# Patient Record
Sex: MALE | Race: WHITE | Employment: FULL TIME | URBAN - METROPOLITAN AREA
[De-identification: names, ages, dates, MRNs, and addresses within clinical notes are randomized per-mention and may not be internally consistent; named-entity substitution may affect disease eponyms.]

---

## 2017-05-23 ENCOUNTER — OFFICE VISIT (OUTPATIENT)
Dept: FAMILY MEDICINE CLINIC | Age: 47
End: 2017-05-23

## 2017-05-23 VITALS
WEIGHT: 315 LBS | HEIGHT: 72 IN | SYSTOLIC BLOOD PRESSURE: 134 MMHG | OXYGEN SATURATION: 97 % | BODY MASS INDEX: 42.66 KG/M2 | HEART RATE: 68 BPM | DIASTOLIC BLOOD PRESSURE: 88 MMHG

## 2017-05-23 DIAGNOSIS — J30.1 SEASONAL ALLERGIC RHINITIS DUE TO POLLEN: ICD-10-CM

## 2017-05-23 DIAGNOSIS — E66.01 OBESITY, CLASS III, BMI 40-49.9 (MORBID OBESITY) (HCC): Primary | ICD-10-CM

## 2017-05-23 DIAGNOSIS — I10 ESSENTIAL HYPERTENSION: ICD-10-CM

## 2017-05-23 DIAGNOSIS — K58.2 IRRITABLE BOWEL SYNDROME WITH BOTH CONSTIPATION AND DIARRHEA: ICD-10-CM

## 2017-05-23 PROCEDURE — 99214 OFFICE O/P EST MOD 30 MIN: CPT | Performed by: FAMILY MEDICINE

## 2017-05-23 RX ORDER — CETIRIZINE HYDROCHLORIDE 10 MG/1
10 TABLET ORAL DAILY
Qty: 30 TABLET | Refills: 5 | Status: SHIPPED | OUTPATIENT
Start: 2017-05-23 | End: 2021-09-07

## 2017-05-23 RX ORDER — LISINOPRIL 20 MG/1
20 TABLET ORAL DAILY
Qty: 30 TABLET | Refills: 5 | Status: SHIPPED | OUTPATIENT
Start: 2017-05-23 | End: 2021-09-07

## 2017-05-23 RX ORDER — PANTOPRAZOLE SODIUM 40 MG/1
40 TABLET, DELAYED RELEASE ORAL DAILY
Qty: 30 TABLET | Refills: 5 | Status: SHIPPED | OUTPATIENT
Start: 2017-05-23 | End: 2021-09-07

## 2017-05-23 RX ORDER — CHLORDIAZEPOXIDE HYDROCHLORIDE AND CLIDINIUM BROMIDE 5; 2.5 MG/1; MG/1
1 CAPSULE ORAL
Qty: 90 CAPSULE | Refills: 5 | Status: SHIPPED | OUTPATIENT
Start: 2017-05-23 | End: 2021-09-07

## 2017-05-23 ASSESSMENT — ENCOUNTER SYMPTOMS
SINUS PRESSURE: 1
SHORTNESS OF BREATH: 0
DIARRHEA: 1
ABDOMINAL PAIN: 0
CHOKING: 0
CHEST TIGHTNESS: 0
COUGH: 0
CONSTIPATION: 1
BACK PAIN: 0
RHINORRHEA: 1

## 2021-09-07 ENCOUNTER — APPOINTMENT (OUTPATIENT)
Dept: CT IMAGING | Age: 51
DRG: 177 | End: 2021-09-07
Payer: COMMERCIAL

## 2021-09-07 ENCOUNTER — APPOINTMENT (OUTPATIENT)
Dept: GENERAL RADIOLOGY | Age: 51
DRG: 177 | End: 2021-09-07
Payer: COMMERCIAL

## 2021-09-07 ENCOUNTER — HOSPITAL ENCOUNTER (INPATIENT)
Age: 51
LOS: 2 days | Discharge: HOME | DRG: 177 | End: 2021-09-09
Attending: STUDENT IN AN ORGANIZED HEALTH CARE EDUCATION/TRAINING PROGRAM | Admitting: STUDENT IN AN ORGANIZED HEALTH CARE EDUCATION/TRAINING PROGRAM
Payer: COMMERCIAL

## 2021-09-07 DIAGNOSIS — J96.01 ACUTE RESPIRATORY FAILURE WITH HYPOXIA (HCC): Primary | ICD-10-CM

## 2021-09-07 DIAGNOSIS — U07.1 COVID-19: ICD-10-CM

## 2021-09-07 PROBLEM — J12.82 PNEUMONIA DUE TO COVID-19 VIRUS: Status: ACTIVE | Noted: 2021-09-07

## 2021-09-07 PROBLEM — I10 HTN (HYPERTENSION): Status: ACTIVE | Noted: 2021-09-07

## 2021-09-07 LAB
ANION GAP SERPL CALCULATED.3IONS-SCNC: 14 MMOL/L (ref 3–16)
BASOPHILS ABSOLUTE: 0.1 K/UL (ref 0–0.2)
BASOPHILS RELATIVE PERCENT: 1 %
BUN BLDV-MCNC: 6 MG/DL (ref 7–20)
CALCIUM SERPL-MCNC: 8.9 MG/DL (ref 8.3–10.6)
CHLORIDE BLD-SCNC: 97 MMOL/L (ref 99–110)
CO2: 24 MMOL/L (ref 21–32)
CREAT SERPL-MCNC: 0.8 MG/DL (ref 0.9–1.3)
EKG ATRIAL RATE: 74 BPM
EKG DIAGNOSIS: NORMAL
EKG P AXIS: 15 DEGREES
EKG P-R INTERVAL: 162 MS
EKG Q-T INTERVAL: 420 MS
EKG QRS DURATION: 96 MS
EKG QTC CALCULATION (BAZETT): 466 MS
EKG R AXIS: 71 DEGREES
EKG T AXIS: -3 DEGREES
EKG VENTRICULAR RATE: 74 BPM
EOSINOPHILS ABSOLUTE: 0.3 K/UL (ref 0–0.6)
EOSINOPHILS RELATIVE PERCENT: 4 %
GFR AFRICAN AMERICAN: >60
GFR NON-AFRICAN AMERICAN: >60
GLUCOSE BLD-MCNC: 89 MG/DL (ref 70–99)
HCT VFR BLD CALC: 42.9 % (ref 40.5–52.5)
HEMOGLOBIN: 14.7 G/DL (ref 13.5–17.5)
LYMPHOCYTES ABSOLUTE: 2.2 K/UL (ref 1–5.1)
LYMPHOCYTES RELATIVE PERCENT: 28 %
MCH RBC QN AUTO: 31 PG (ref 26–34)
MCHC RBC AUTO-ENTMCNC: 34.2 G/DL (ref 31–36)
MCV RBC AUTO: 90.7 FL (ref 80–100)
MONOCYTES ABSOLUTE: 0.8 K/UL (ref 0–1.3)
MONOCYTES RELATIVE PERCENT: 10 %
NEUTROPHILS ABSOLUTE: 4.4 K/UL (ref 1.7–7.7)
NEUTROPHILS RELATIVE PERCENT: 57 %
PDW BLD-RTO: 12.6 % (ref 12.4–15.4)
PLATELET # BLD: 252 K/UL (ref 135–450)
PLATELET SLIDE REVIEW: ADEQUATE
PMV BLD AUTO: 8.3 FL (ref 5–10.5)
POTASSIUM REFLEX MAGNESIUM: 3.6 MMOL/L (ref 3.5–5.1)
PRO-BNP: 67 PG/ML (ref 0–124)
RBC # BLD: 4.73 M/UL (ref 4.2–5.9)
RBC # BLD: NORMAL 10*6/UL
SLIDE REVIEW: NORMAL
SODIUM BLD-SCNC: 135 MMOL/L (ref 136–145)
TROPONIN: <0.01 NG/ML
WBC # BLD: 7.7 K/UL (ref 4–11)

## 2021-09-07 PROCEDURE — 6360000002 HC RX W HCPCS: Performed by: STUDENT IN AN ORGANIZED HEALTH CARE EDUCATION/TRAINING PROGRAM

## 2021-09-07 PROCEDURE — U0003 INFECTIOUS AGENT DETECTION BY NUCLEIC ACID (DNA OR RNA); SEVERE ACUTE RESPIRATORY SYNDROME CORONAVIRUS 2 (SARS-COV-2) (CORONAVIRUS DISEASE [COVID-19]), AMPLIFIED PROBE TECHNIQUE, MAKING USE OF HIGH THROUGHPUT TECHNOLOGIES AS DESCRIBED BY CMS-2020-01-R: HCPCS

## 2021-09-07 PROCEDURE — U0005 INFEC AGEN DETEC AMPLI PROBE: HCPCS

## 2021-09-07 PROCEDURE — 2580000003 HC RX 258: Performed by: STUDENT IN AN ORGANIZED HEALTH CARE EDUCATION/TRAINING PROGRAM

## 2021-09-07 PROCEDURE — 1200000000 HC SEMI PRIVATE

## 2021-09-07 PROCEDURE — 85025 COMPLETE CBC W/AUTO DIFF WBC: CPT

## 2021-09-07 PROCEDURE — 99285 EMERGENCY DEPT VISIT HI MDM: CPT

## 2021-09-07 PROCEDURE — 84484 ASSAY OF TROPONIN QUANT: CPT

## 2021-09-07 PROCEDURE — 6370000000 HC RX 637 (ALT 250 FOR IP): Performed by: PHYSICIAN ASSISTANT

## 2021-09-07 PROCEDURE — 93010 ELECTROCARDIOGRAM REPORT: CPT | Performed by: INTERNAL MEDICINE

## 2021-09-07 PROCEDURE — 83880 ASSAY OF NATRIURETIC PEPTIDE: CPT

## 2021-09-07 PROCEDURE — 71260 CT THORAX DX C+: CPT

## 2021-09-07 PROCEDURE — 71046 X-RAY EXAM CHEST 2 VIEWS: CPT

## 2021-09-07 PROCEDURE — 80048 BASIC METABOLIC PNL TOTAL CA: CPT

## 2021-09-07 PROCEDURE — 6360000004 HC RX CONTRAST MEDICATION: Performed by: PHYSICIAN ASSISTANT

## 2021-09-07 PROCEDURE — 36415 COLL VENOUS BLD VENIPUNCTURE: CPT

## 2021-09-07 PROCEDURE — 93005 ELECTROCARDIOGRAM TRACING: CPT | Performed by: STUDENT IN AN ORGANIZED HEALTH CARE EDUCATION/TRAINING PROGRAM

## 2021-09-07 RX ORDER — BENZONATATE 100 MG/1
100 CAPSULE ORAL 3 TIMES DAILY PRN
Status: DISCONTINUED | OUTPATIENT
Start: 2021-09-07 | End: 2021-09-09 | Stop reason: HOSPADM

## 2021-09-07 RX ORDER — FUROSEMIDE 40 MG/1
40 TABLET ORAL DAILY PRN
COMMUNITY

## 2021-09-07 RX ORDER — ONDANSETRON 2 MG/ML
4 INJECTION INTRAMUSCULAR; INTRAVENOUS EVERY 6 HOURS PRN
Status: DISCONTINUED | OUTPATIENT
Start: 2021-09-07 | End: 2021-09-09 | Stop reason: HOSPADM

## 2021-09-07 RX ORDER — SODIUM CHLORIDE 0.9 % (FLUSH) 0.9 %
5-40 SYRINGE (ML) INJECTION PRN
Status: DISCONTINUED | OUTPATIENT
Start: 2021-09-07 | End: 2021-09-09 | Stop reason: HOSPADM

## 2021-09-07 RX ORDER — PREDNISONE 20 MG/1
60 TABLET ORAL ONCE
Status: COMPLETED | OUTPATIENT
Start: 2021-09-07 | End: 2021-09-07

## 2021-09-07 RX ORDER — SODIUM CHLORIDE 0.9 % (FLUSH) 0.9 %
5-40 SYRINGE (ML) INJECTION EVERY 12 HOURS SCHEDULED
Status: DISCONTINUED | OUTPATIENT
Start: 2021-09-07 | End: 2021-09-09 | Stop reason: HOSPADM

## 2021-09-07 RX ORDER — ACETAMINOPHEN 325 MG/1
650 TABLET ORAL EVERY 6 HOURS PRN
Status: DISCONTINUED | OUTPATIENT
Start: 2021-09-07 | End: 2021-09-09 | Stop reason: HOSPADM

## 2021-09-07 RX ORDER — DEXAMETHASONE 6 MG/1
6 TABLET ORAL DAILY
Status: DISCONTINUED | OUTPATIENT
Start: 2021-09-08 | End: 2021-09-09 | Stop reason: HOSPADM

## 2021-09-07 RX ORDER — PANTOPRAZOLE SODIUM 40 MG/1
40 TABLET, DELAYED RELEASE ORAL DAILY
COMMUNITY

## 2021-09-07 RX ORDER — SODIUM CHLORIDE 9 MG/ML
25 INJECTION, SOLUTION INTRAVENOUS PRN
Status: DISCONTINUED | OUTPATIENT
Start: 2021-09-07 | End: 2021-09-09 | Stop reason: HOSPADM

## 2021-09-07 RX ORDER — METOPROLOL SUCCINATE 50 MG/1
50 TABLET, EXTENDED RELEASE ORAL DAILY
COMMUNITY

## 2021-09-07 RX ORDER — BENZONATATE 100 MG/1
200 CAPSULE ORAL ONCE
Status: COMPLETED | OUTPATIENT
Start: 2021-09-07 | End: 2021-09-07

## 2021-09-07 RX ORDER — LORAZEPAM 1 MG/1
1 TABLET ORAL DAILY PRN
COMMUNITY

## 2021-09-07 RX ORDER — GUAIFENESIN/DEXTROMETHORPHAN 100-10MG/5
5 SYRUP ORAL EVERY 4 HOURS PRN
Status: DISCONTINUED | OUTPATIENT
Start: 2021-09-07 | End: 2021-09-09 | Stop reason: HOSPADM

## 2021-09-07 RX ORDER — ACETAMINOPHEN 650 MG/1
650 SUPPOSITORY RECTAL EVERY 6 HOURS PRN
Status: DISCONTINUED | OUTPATIENT
Start: 2021-09-07 | End: 2021-09-09 | Stop reason: HOSPADM

## 2021-09-07 RX ADMIN — BENZONATATE 200 MG: 100 CAPSULE ORAL at 15:56

## 2021-09-07 RX ADMIN — ENOXAPARIN SODIUM 30 MG: 30 INJECTION SUBCUTANEOUS at 23:45

## 2021-09-07 RX ADMIN — PREDNISONE 60 MG: 20 TABLET ORAL at 15:56

## 2021-09-07 RX ADMIN — IOPAMIDOL 75 ML: 755 INJECTION, SOLUTION INTRAVENOUS at 16:12

## 2021-09-07 RX ADMIN — Medication 10 ML: at 23:46

## 2021-09-07 ASSESSMENT — ENCOUNTER SYMPTOMS
COUGH: 1
DIARRHEA: 1
ABDOMINAL PAIN: 0
SHORTNESS OF BREATH: 1
COLOR CHANGE: 0

## 2021-09-07 ASSESSMENT — PAIN SCALES - GENERAL: PAINLEVEL_OUTOF10: 0

## 2021-09-07 NOTE — ED TRIAGE NOTES
Pt here after testing positive for covid 15 days ago. Pt was feeling better until 2 days ago then started with nasal congestion and coughing more. pts son was just admitted to hospital today.

## 2021-09-07 NOTE — PROGRESS NOTES
Medication Reconciliation    List of medications patient is currently taking is complete. Source of information: 1. Conversation with patient                                       2. EPIC records     Patient did not take any of his home medications yet today.      Ty Waite [facility], PharmD, BCPS  9/7/2021 5:20 PM

## 2021-09-07 NOTE — ED PROVIDER NOTES
[address] [facility]      Pt Name: Lyn Blount  MRN: [id number]  [place] 1970  Date of evaluation: 9/7/2021  Provider: CAREN Garcia Mc    This patient was not seen and evaluated by the attending physician No att. providers found. CHIEF COMPLAINT       Chief Complaint   Patient presents with    Shortness of Breath     + covid 3 weeks ago. increased sob 3 days ago. cough, denies fever. brown productive cough. CRITICAL CARE TIME   I performed a total Critical Care time of 31 minutes, excluding separately reportable procedures. There was a high probability of clinically significant/life threatening deterioration in the patient's condition which required my urgent intervention. Not limited to multiple reexaminations, discussions with attending physician and consultants. HISTORY OF PRESENT ILLNESS  (Location/Symptom, Timing/Onset, Context/Setting, Quality, Duration, Modifying Factors, Severity.)   Lyn Blount is a 53 y.o. male who presents to the emergency department with complaint of continued shortness of breath cough fatigue. He states that on 23 August he developed symptoms on 27 August he tested positive. His son has also been sick and had Covid. He has tried over-the-counter medications including Coricidin, Mucinex, Tylenol. He has past medical history of hypertension but states he is been off of his metoprolol for about 6 months otherwise he denies known chronic medical problems. No leg swelling. No abdominal pain. He has had diarrhea. Nursing Notes were reviewed and I agree. REVIEW OF SYSTEMS    (2-9 systems for level 4, 10 or more for level 5)     Review of Systems   Constitutional: Positive for fatigue and fever. Respiratory: Positive for cough and shortness of breath. Cardiovascular: Negative for chest pain. Gastrointestinal: Positive for diarrhea. Negative for abdominal pain. Musculoskeletal: Positive for myalgias. Negative for neck pain and neck stiffness. Skin: Negative for color change, rash and wound. Neurological: Negative for weakness and numbness. Psychiatric/Behavioral: Negative for agitation, behavioral problems and confusion. Except as noted above the remainder of the review of systems was reviewed and negative. PAST MEDICAL HISTORY         Diagnosis Date    GERD (gastroesophageal reflux disease)        SURGICAL HISTORY           Procedure Laterality Date    KNEE SURGERY Right 2001    KNEE SURGERY  2001       CURRENT MEDICATIONS       Previous Medications    FUROSEMIDE (LASIX) 40 MG TABLET    Take 40 mg by mouth daily as needed (swelling)     LORAZEPAM (ATIVAN) 1 MG TABLET    Take 1 mg by mouth daily as needed for Anxiety. METOPROLOL SUCCINATE (TOPROL XL) 50 MG EXTENDED RELEASE TABLET    Take 50 mg by mouth daily    PANTOPRAZOLE (PROTONIX) 40 MG TABLET    Take 40 mg by mouth daily       ALLERGIES     Hydrochlorothiazide and Nebivolol hcl    FAMILY HISTORY           Problem Relation Age of Onset    Cancer Mother     High Blood Pressure Mother     Heart Disease Mother     Heart Disease Father     Diabetes Father     Heart Disease Maternal Grandfather     Heart Disease Paternal Grandfather      Family Status   Relation Name Status    Mother      Father      MGF  (Not Specified)    PGF  (Not Specified)        SOCIAL HISTORY      reports that he has never smoked. He has never used smokeless tobacco. He reports that he does not drink alcohol and does not use drugs.     PHYSICAL EXAM    (up to 7 for level 4, 8 or more for level 5)     ED Triage Vitals   BP Temp Temp Source Pulse Resp SpO2 Height Weight   21 1255 21 1255 21 1255 21 1255 21 1255 21 1255 21 1245 21 1245   (!) 130/90 98.6 °F (37 °C) Tympanic 87 18 90 % 6' (1.829 m) (!) 333 lb 1.8 oz (151.1 kg)       Physical Exam  Vitals and nursing note reviewed. Constitutional:       Appearance: He is well-developed. HENT:      Head: Normocephalic and atraumatic. Cardiovascular:      Rate and Rhythm: Normal rate. Pulmonary:      Effort: Pulmonary effort is normal.      Breath sounds: Wheezing present. No decreased breath sounds. Musculoskeletal:      Cervical back: Normal range of motion. Right lower leg: No edema. Left lower leg: No edema. Skin:     General: Skin is warm. Neurological:      General: No focal deficit present. Mental Status: He is alert and oriented to person, place, and time. Psychiatric:         Behavior: Behavior normal.         DIAGNOSTIC RESULTS     EKG: All EKG's are interpreted by CAREN Ibrahim in the absence of a cardiologist.    EKG interpreted by myself - please refer to attending physician's note for complete EKG interpretation:    No evidence of acute ischemia or injury. RADIOLOGY:   Non-plain film images such as CT, Ultrasound and MRI are read by the radiologist. Plain radiographic images are visualized and preliminarily interpreted by CAREN Ibrahim with the below findings:    Reviewed radiologist's interpretation. Interpretation per the Radiologist below, if available at the time of this note:    CT CHEST PULMONARY EMBOLISM W CONTRAST   Final Result   1. Patchy nodular bilateral airspace disease due to an   infectious/inflammatory process including atypical viral pneumonia. XR CHEST (2 VW)   Final Result   Bilateral interstitial groundglass opacities are nonspecific and may   represent viral pneumonia, COVID-19 is not excluded.                LABS:  Labs Reviewed   BASIC METABOLIC PANEL W/ REFLEX TO MG FOR LOW K - Abnormal; Notable for the following components:       Result Value    Sodium 135 (*)     Chloride 97 (*)     BUN 6 (*)     CREATININE 0.8 (*)     All other components within normal limits    Narrative:     Performed at:  [facility]  [address] [place], [place] [address]   Phone [phone]   CBC WITH AUTO DIFFERENTIAL    Narrative:     Performed at:  [facility]  [address] [place], [place] [address]   Phone [phone]   BRAIN NATRIURETIC PEPTIDE    Narrative:     Performed at:  [facility]  [address] [place], [place] [address]   Phone [phone]   TROPONIN    Narrative:     Performed at:  [facility]  [address] [place], [place] [address]   Phone [phone]       All other labs were within normal range or not returned as of this dictation. EMERGENCY DEPARTMENT COURSE and DIFFERENTIAL DIAGNOSIS/MDM:   Vitals:    Vitals:    09/07/21 1255 09/07/21 1639 09/07/21 1658 09/07/21 1821   BP: (!) 130/90  117/70 115/77   Pulse: 87 83 74 77   Resp: 18 20 19 19   Temp: 98.6 °F (37 °C)   98.1 °F (36.7 °C)   TempSrc: Tympanic   Oral   SpO2: 90% 94% 93% 94%   Weight:       Height:         I discussed with Yamilex Avila and/or family the exam results, diagnosis, care, prognosis, reasons to return and the importance of follow up. Patient and/or family is in full agreement with plan and all questions have been answered. Specific discharge instructions explained, including reasons to return to the emergency department. Yamilex Avila is well appearing, non-toxic, and afebrile at the time of discharge. Patient is afebrile not tachycardic. He saturating 90% on room air at rest however it with ambulation he dropped to 86%. Positive for Covid several weeks ago but has had increasing and persistent symptoms. In light of hypoxia will consult hospitalist for admission. He stable on 2 L via nasal cannula currently.     I estimate there is LOW risk for PULMONARY EMBOLISM, PULMONARY EDEMA, PNEUMONIA, PNEUMOTHORAX, STATUS ASTHMATICUS, ACUTE RESPIRATORY FAILURE, OR ACUTE CORONARY SYNDROME, thus I consider the discharge disposition reasonable. CONSULTS:  IP CONSULT TO HOSPITALIST    PROCEDURES:  Procedures      FINAL IMPRESSION      1. Acute respiratory failure with hypoxia (HCC)    2. COVID-19          DISPOSITION/PLAN   DISPOSITION Decision To Admit 09/07/2021 07:47:06 PM      PATIENT REFERRED TO:  No follow-up provider specified.     DISCHARGE MEDICATIONS:  New Prescriptions    No medications on file       (Please note that portions of this note were completed with a voice recognition program.  Efforts were made to edit the dictations but occasionally words are mis-transcribed.)    Lena Chaves, [address], [address]  09/07/21 0109

## 2021-09-07 NOTE — ED NOTES
Pts O2 drops to 86% when ambulating. Pt returns to 92% after sitting with rest. Laura FABIAN notified. Pt started on 2L NC.       Andreas Ledesma RN  09/07/21 7560 Meme Voss RN  09/07/21 9730

## 2021-09-07 NOTE — H&P
Hospital Medicine History & Physical      PCP: Antonette Sanders MD    Date of Admission: 9/7/2021    Date of Service: Pt seen/examined on 9/7/2021 and Admitted to Inpatient    Chief Complaint: Worsening shortness of breath, fevers, chills, increasing fatigue, poor appetite      History Of Present Illness: The patient is a 53 y.o. male past medical history of GERD and hypertension who presents to [facility] with initially 3 weeks of persisting symptoms of increasing fatigue and poor appetite as well as some mild initial shortness of breath especially on exertion but has progressively been worsening over the last week or so. Patient apparently tested positive for Covid with initial symptoms starting 3 weeks ago. His son is also diagnosed positive with Covid and potentially his wife is also affected but she currently seems to be more benign at this time. Patient notes he tried multiple over-the-counter medications for his symptoms but he has progressively been declining and has had increasing level of cough and congestion with intermittent fevers and chills at home and increasing fatigue to the point of his inability to function properly at home. He notes that the shortness of breath has been worsening mainly on exertion but does not feel entirely short of breath at rest.  Otherwise denies any other recent symptoms of abdominal pain/nausea/vomiting/diarrhea, dysuria, blood in urine/stool/sputum, leg swelling, chest pain other than what is noted from his coughing.     Past Medical History:        Diagnosis Date    GERD (gastroesophageal reflux disease)        Past Surgical History:        Procedure Laterality Date    KNEE SURGERY Right 2001    KNEE SURGERY  09/02/2001       Medications Prior to Admission:    Prior to Admission medications    Medication Sig Start Date End Date Taking? Authorizing Provider   pantoprazole (PROTONIX) 40 MG tablet Take 40 mg by mouth daily   Yes Historical Provider, MD   metoprolol succinate (TOPROL XL) 50 MG extended release tablet Take 50 mg by mouth daily   Yes Historical Provider, MD   furosemide (LASIX) 40 MG tablet Take 40 mg by mouth daily as needed (swelling)    Yes Historical Provider, MD   LORazepam (ATIVAN) 1 MG tablet Take 1 mg by mouth daily as needed for Anxiety. Yes Historical Provider, MD       Allergies:  Hydrochlorothiazide and Nebivolol hcl    Social History:  The patient currently lives home    TOBACCO:   reports that he has never smoked. He has never used smokeless tobacco.  ETOH:   reports no history of alcohol use. Family History:  Reviewed in detail and negative for DM, Early CAD, Cancer, CVA. Positive as follows:        Problem Relation Age of Onset    Cancer Mother     High Blood Pressure Mother     Heart Disease Mother     Heart Disease Father     Diabetes Father     Heart Disease Maternal Grandfather     Heart Disease Paternal Grandfather        REVIEW OF SYSTEMS:   as noted in the HPI. All other systems reviewed and negative. PHYSICAL EXAM:    /84   Pulse 71   Temp 97.5 °F (36.4 °C) (Oral)   Resp 16   Ht 6' (1.829 m)   Wt (!) 333 lb 8.9 oz (151.3 kg)   SpO2 94%   BMI 45.24 kg/m²     General appearance: Fatigued appearing, on nasal cannula oxygen, currently seems comfortable, alert and oriented x4  HEENT Normal cephalic, atraumatic without obvious deformity. Pupils equal, round, and reactive to light. Extra ocular muscles intact. Conjunctivae/corneas clear.   Neck: Supple, no JVD  Lungs: Diminished breath sounds bilaterally, no wheezing, crackles noted midway up the lungs bilaterally  Heart: Regular rate and rhythm with Normal S1/S2 without murmurs, rubs or gallops, point of maximum impulse non-displaced  Abdomen: Soft, non-tender or non-distended without rigidity or guarding and positive bowel sounds all four quadrants. Extremities: No clubbing, cyanosis, or edema bilaterally. Full range of motion without deformity and normal gait intact. Skin: Skin color, texture, turgor normal.  No rashes or lesions. Neurologic: Alert and oriented X 3, neurovascularly intact with sensory/motor intact upper extremities/lower extremities, bilaterally. Cranial nerves: II-XII intact, grossly non-focal.  Mental status: Alert, oriented, thought content appropriate. Capillary Refill: Acceptable  < 3 seconds  Peripheral Pulses: +3 Easily felt, not easily obliterated with pressure    CT chest pulmonary embolism with IV contrast: Patchy nodular bilateral airspace disease due to an infectious or inflammatory process including atypical viral pneumonia. CBC   Recent Labs     09/07/21  1316 09/08/21  0644   WBC 7.7 6.3   HGB 14.7 15.2   HCT 42.9 43.3    254      RENAL  Recent Labs     09/07/21  1316 09/08/21  0644   * 136   K 3.6 4.1   CL 97* 98*   CO2 24 26   BUN 6* 8   CREATININE 0.8* 0.7*     LFT'S  No results for input(s): AST, ALT, ALB, BILIDIR, BILITOT, ALKPHOS in the last 72 hours. COAG  No results for input(s): INR in the last 72 hours.   CARDIAC ENZYMES  Recent Labs     09/07/21 1316   TROPONINI <0.01       U/A:  No results found for: NITRITE, COLORU, WBCUA, RBCUA, MUCUS, BACTERIA, CLARITYU, SPECGRAV, LEUKOCYTESUR, BLOODU, GLUCOSEU, AMORPHOUS    ABG  No results found for: DYO0PYR, BEART, O8PONCCS, PHART, THGBART, RCP4NWT, PO2ART, ENC8WBM        Active Hospital Problems    Diagnosis Date Noted    Pneumonia due to COVID-19 virus [U07.1, J12.82] 09/07/2021     Priority: High    HTN (hypertension) [I10] 09/07/2021         PHYSICIANS CERTIFICATION:    I certify that Greg De Anda is expected to be hospitalized for greater than 2 midnights based on the following assessment and plan:      ASSESSMENT/PLAN:  · Patient currently maintaining oxygen status on 2 L, starting patient on Decadron therapy daily  · Reevaluation to consider other Covid therapies  · Repeat labs in the morning      DVT Prophylaxis: Lovenox  Diet: ADULT DIET; Regular; Low Sodium (2 gm)  Code Status: Limited  PT/OT Eval Status: Ambulatory    Dispo -pending clinical course       Tom Patton DO    Thank you Boubacar Orta MD for the opportunity to be involved in this patient's care. If you have any questions or concerns please feel free to contact me at [phone].

## 2021-09-07 NOTE — LETTER
NOTIFICATION RETURN TO WORK / SCHOOL    9/9/2021    Mr. Tejal Correa  [address] [address]      To Whom It May Concern:    Tejal Correa was tested for COVID-19 on September 7, 2021 and the result was positive. He may return to work after September 21, 2021. If there are questions or concerns, please have the patient contact our office. Sincerely,      ____________________________________  Jayson Molina.  [facility] TEE, [facility]   [phone]

## 2021-09-08 LAB
ANION GAP SERPL CALCULATED.3IONS-SCNC: 12 MMOL/L (ref 3–16)
BASOPHILS ABSOLUTE: 0.1 K/UL (ref 0–0.2)
BASOPHILS RELATIVE PERCENT: 1.1 %
BUN BLDV-MCNC: 8 MG/DL (ref 7–20)
C-REACTIVE PROTEIN: 10.7 MG/L (ref 0–5.1)
CALCIUM SERPL-MCNC: 9 MG/DL (ref 8.3–10.6)
CHLORIDE BLD-SCNC: 98 MMOL/L (ref 99–110)
CO2: 26 MMOL/L (ref 21–32)
CREAT SERPL-MCNC: 0.7 MG/DL (ref 0.9–1.3)
D DIMER: <200 NG/ML DDU (ref 0–229)
EOSINOPHILS ABSOLUTE: 0 K/UL (ref 0–0.6)
EOSINOPHILS RELATIVE PERCENT: 0.6 %
GFR AFRICAN AMERICAN: >60
GFR NON-AFRICAN AMERICAN: >60
GLUCOSE BLD-MCNC: 113 MG/DL (ref 70–99)
HCT VFR BLD CALC: 43.3 % (ref 40.5–52.5)
HEMOGLOBIN: 15.2 G/DL (ref 13.5–17.5)
LYMPHOCYTES ABSOLUTE: 1.2 K/UL (ref 1–5.1)
LYMPHOCYTES RELATIVE PERCENT: 19 %
MAGNESIUM: 2.2 MG/DL (ref 1.8–2.4)
MCH RBC QN AUTO: 31.9 PG (ref 26–34)
MCHC RBC AUTO-ENTMCNC: 35.2 G/DL (ref 31–36)
MCV RBC AUTO: 90.5 FL (ref 80–100)
MONOCYTES ABSOLUTE: 0.7 K/UL (ref 0–1.3)
MONOCYTES RELATIVE PERCENT: 11.1 %
NEUTROPHILS ABSOLUTE: 4.3 K/UL (ref 1.7–7.7)
NEUTROPHILS RELATIVE PERCENT: 68.2 %
PDW BLD-RTO: 12.4 % (ref 12.4–15.4)
PLATELET # BLD: 254 K/UL (ref 135–450)
PMV BLD AUTO: 8.2 FL (ref 5–10.5)
POTASSIUM SERPL-SCNC: 4.1 MMOL/L (ref 3.5–5.1)
PROCALCITONIN: 0.06 NG/ML (ref 0–0.15)
RBC # BLD: 4.78 M/UL (ref 4.2–5.9)
SARS-COV-2: DETECTED
SODIUM BLD-SCNC: 136 MMOL/L (ref 136–145)
WBC # BLD: 6.3 K/UL (ref 4–11)

## 2021-09-08 PROCEDURE — 83735 ASSAY OF MAGNESIUM: CPT

## 2021-09-08 PROCEDURE — 2700000000 HC OXYGEN THERAPY PER DAY

## 2021-09-08 PROCEDURE — 1200000000 HC SEMI PRIVATE

## 2021-09-08 PROCEDURE — 6370000000 HC RX 637 (ALT 250 FOR IP): Performed by: STUDENT IN AN ORGANIZED HEALTH CARE EDUCATION/TRAINING PROGRAM

## 2021-09-08 PROCEDURE — 84145 PROCALCITONIN (PCT): CPT

## 2021-09-08 PROCEDURE — 2580000003 HC RX 258: Performed by: STUDENT IN AN ORGANIZED HEALTH CARE EDUCATION/TRAINING PROGRAM

## 2021-09-08 PROCEDURE — 36415 COLL VENOUS BLD VENIPUNCTURE: CPT

## 2021-09-08 PROCEDURE — 85379 FIBRIN DEGRADATION QUANT: CPT

## 2021-09-08 PROCEDURE — 80048 BASIC METABOLIC PNL TOTAL CA: CPT

## 2021-09-08 PROCEDURE — 6360000002 HC RX W HCPCS: Performed by: STUDENT IN AN ORGANIZED HEALTH CARE EDUCATION/TRAINING PROGRAM

## 2021-09-08 PROCEDURE — 85025 COMPLETE CBC W/AUTO DIFF WBC: CPT

## 2021-09-08 PROCEDURE — 86140 C-REACTIVE PROTEIN: CPT

## 2021-09-08 PROCEDURE — 94760 N-INVAS EAR/PLS OXIMETRY 1: CPT

## 2021-09-08 RX ORDER — LORAZEPAM 1 MG/1
1 TABLET ORAL DAILY PRN
Status: DISCONTINUED | OUTPATIENT
Start: 2021-09-08 | End: 2021-09-09 | Stop reason: HOSPADM

## 2021-09-08 RX ORDER — METOPROLOL SUCCINATE 50 MG/1
50 TABLET, EXTENDED RELEASE ORAL DAILY
Status: DISCONTINUED | OUTPATIENT
Start: 2021-09-08 | End: 2021-09-09 | Stop reason: HOSPADM

## 2021-09-08 RX ORDER — PANTOPRAZOLE SODIUM 40 MG/1
40 TABLET, DELAYED RELEASE ORAL DAILY
Status: DISCONTINUED | OUTPATIENT
Start: 2021-09-08 | End: 2021-09-09 | Stop reason: HOSPADM

## 2021-09-08 RX ADMIN — GUAIFENESIN SYRUP AND DEXTROMETHORPHAN 5 ML: 100; 10 SYRUP ORAL at 16:32

## 2021-09-08 RX ADMIN — BENZONATATE 100 MG: 100 CAPSULE ORAL at 16:32

## 2021-09-08 RX ADMIN — ACETAMINOPHEN 650 MG: 325 TABLET ORAL at 08:27

## 2021-09-08 RX ADMIN — DEXAMETHASONE 6 MG: 6 TABLET ORAL at 08:26

## 2021-09-08 RX ADMIN — ENOXAPARIN SODIUM 30 MG: 30 INJECTION SUBCUTANEOUS at 20:42

## 2021-09-08 RX ADMIN — ENOXAPARIN SODIUM 30 MG: 30 INJECTION SUBCUTANEOUS at 08:26

## 2021-09-08 RX ADMIN — Medication 10 ML: at 08:27

## 2021-09-08 RX ADMIN — BENZONATATE 100 MG: 100 CAPSULE ORAL at 00:58

## 2021-09-08 RX ADMIN — Medication 10 ML: at 20:44

## 2021-09-08 ASSESSMENT — PAIN DESCRIPTION - FREQUENCY: FREQUENCY: INTERMITTENT

## 2021-09-08 ASSESSMENT — PAIN DESCRIPTION - PROGRESSION: CLINICAL_PROGRESSION: NOT CHANGED

## 2021-09-08 ASSESSMENT — PAIN - FUNCTIONAL ASSESSMENT: PAIN_FUNCTIONAL_ASSESSMENT: ACTIVITIES ARE NOT PREVENTED

## 2021-09-08 ASSESSMENT — PAIN SCALES - GENERAL
PAINLEVEL_OUTOF10: 3
PAINLEVEL_OUTOF10: 0
PAINLEVEL_OUTOF10: 0
PAINLEVEL_OUTOF10: 3
PAINLEVEL_OUTOF10: 0

## 2021-09-08 ASSESSMENT — PAIN DESCRIPTION - ORIENTATION: ORIENTATION: RIGHT;LEFT

## 2021-09-08 ASSESSMENT — PAIN DESCRIPTION - ONSET: ONSET: GRADUAL

## 2021-09-08 ASSESSMENT — PAIN DESCRIPTION - PAIN TYPE: TYPE: ACUTE PAIN

## 2021-09-08 ASSESSMENT — PAIN DESCRIPTION - LOCATION: LOCATION: HEAD

## 2021-09-08 ASSESSMENT — PAIN DESCRIPTION - DESCRIPTORS: DESCRIPTORS: HEADACHE

## 2021-09-08 NOTE — ACP (ADVANCE CARE PLANNING)
Advance Care Planning     Advance Care Planning Activator (Inpatient)  Conversation Note      Date of ACP Conversation: 9/8/2021     Conversation Conducted with: Patient with Decision Making Capacity    ACP Activator: TEE Morton, ANIW    Health Care Decision Maker:     Current Designated Health Care Decision Maker:     Primary Decision Maker: Betsy Hagen Spouse - [phone]    Care Preferences    Ventilation: \"If you were in your present state of health and suddenly became very ill and were unable to breathe on your own, what would your preference be about the use of a ventilator (breathing machine) if it were available to you? \"      Would the patient desire the use of ventilator (breathing machine)?: yes    \"If your health worsens and it becomes clear that your chance of recovery is unlikely, what would your preference be about the use of a ventilator (breathing machine) if it were available to you? \"     Would the patient desire the use of ventilator (breathing machine)?: Yes      Resuscitation  \"CPR works best to restart the heart when there is a sudden event, like a heart attack, in someone who is otherwise healthy. Unfortunately, CPR does not typically restart the heart for people who have serious health conditions or who are very sick. \"    \"In the event your heart stopped as a result of an underlying serious health condition, would you want attempts to be made to restart your heart (answer \"yes\" for attempt to resuscitate) or would you prefer a natural death (answer \"no\" for do not attempt to resuscitate)? \" no       [] Yes   [x] No   Educated Patient / Newt Lecompte regarding differences between Advance Directives and portable DNR orders.     Length of ACP Conversation in minutes:      Conversation Outcomes:  [x] ACP discussion completed  [] Existing advance directive reviewed with patient; no changes to patient's previously recorded wishes  [] New Advance Directive completed  [] Portable Do Not Rescitate prepared for Provider review and signature  [] POLST/POST/MOLST/MOST prepared for Provider review and signature      Follow-up plan:    [] Schedule follow-up conversation to continue planning  [] Referred individual to Provider for additional questions/concerns   [] Advised patient/agent/surrogate to review completed ACP document and update if needed with changes in condition, patient preferences or care setting    [x] This note routed to one or more involved healthcare providers    Electronically signed by TEE Masters LSW on 9/8/2021 at 10:56 AM

## 2021-09-08 NOTE — FLOWSHEET NOTE
Weaned to 1L, O2 95% at this time. Pt independent with care. A&Ox4. Denies any needs. PRN tylenol administered for c/o headache. Will monitor.   Electronically signed by Bertram Hou RN on 9/8/2021 at 10:37 AM

## 2021-09-08 NOTE — PROGRESS NOTES
4 Eyes Skin Assessment     NAME:  Janine Cain  YOB: 1970  MEDICAL RECORD NUMBER:  [id number]    The patient is being assess for  Admission    I agree that 2 RN's have performed a thorough Head to Toe Skin Assessment on the patient. ALL assessment sites listed below have been assessed. Areas assessed by both nurses:    Head, Face, Ears, Shoulders, Back, Chest, Arms, Elbows, Hands, Sacrum. Buttock, Coccyx, Ischium and Legs. Feet and Heels        Does the Patient have a Wound?  No noted wound(s)       Fredrick Prevention initiated:  No   Wound Care Orders initiated:  No    Pressure Injury (Stage 3,4, Unstageable, DTI, NWPT, and Complex wounds) if present place consult order under [de-identified] No    New and Established Ostomies if present place consult order under : No      Nurse 1 eSignature: Electronically signed by Monty Melendrez RN on 9/7/21 at 10:52 PM EDT    **SHARE this note so that the co-signing nurse is able to place an eSignature**    Nurse 2 eSignature: Electronically signed by Lydia Kinney RN on 9/7/21 at 10:52 PM EDT

## 2021-09-08 NOTE — FLOWSHEET NOTE
Weaned to room air. Pt tolerating well at this time. O2 93%. Will monitor.   Electronically signed by Lali Oviedo RN on 9/8/2021 at 2:33 PM

## 2021-09-08 NOTE — PLAN OF CARE
Problem: Airway Clearance - Ineffective  Goal: Achieve or maintain patent airway  Outcome: Ongoing     Problem: Gas Exchange - Impaired  Goal: Absence of hypoxia  Outcome: Ongoing  Note: Pt will maintain absence of respiratory complications. Oxygen saturations >90% at all times with supplemental O2 as needed. Will assess respiratory status every shift and PRN. Encourage to cough and deep breath. Encourage HHN as ordered. Monitor I/O. Maintain IVF's as ordered. Problem: Breathing Pattern - Ineffective  Goal: Ability to achieve and maintain a regular respiratory rate  Outcome: Ongoing     Problem: Body Temperature -  Risk of, Imbalanced  Goal: Ability to maintain a body temperature within defined limits  Outcome: Ongoing     Problem: Isolation Precautions - Risk of Spread of Infection  Goal: Prevent transmission of infection  Outcome: Ongoing     Problem: Nutrition Deficits  Goal: Optimize nutritional status  Outcome: Ongoing     Problem: Risk for Fluid Volume Deficit  Goal: Maintain normal heart rhythm  Outcome: Ongoing     Problem: Loneliness or Risk for Loneliness  Goal: Demonstrate positive use of time alone when socialization is not possible  Outcome: Ongoing     Problem: Pain:  Description: Pain management should include both nonpharmacologic and pharmacologic interventions. Goal: Pain level will decrease  Description: Pain level will decrease  Outcome: Ongoing  Note: Pain/discomfort being managed with PRN analgesics per MD order.   Pt able to express presence and absence of pain and rate pain appropriately using numerical scale

## 2021-09-08 NOTE — PROGRESS NOTES
Pt up to rm [phone] from ED. Pt's vital signs are stable and pt is showing no signs of distress. Pt provided orientation to the room and all questions answered.  Electronically signed by Estevan Wong RN on 9/7/2021 at 10:41 PM

## 2021-09-08 NOTE — CARE COORDINATION
INITIAL CASE MANAGEMENT ASSESSMENT    Reviewed chart, unable to meet with patient due to isolation status. Call to patient's room, spoke with patient over the phone to assess possible discharge needs. Explained Case Management role/services. Living Situation: confirmed address, lives with spouse and two sons, 2 MARLEE    ADLs: independent      DME: none reported    PT/OT Recs: n/a up independently per RN     Active Services: none reported      Transportation: active , family to transport      Medications: Pharmacy: [facility], no issues    PCP: confirmed Boston Garcia MD    PLAN/COMMENTS: return home with family, denies needs     CM provided contact information for patient or family to call with any questions. CM will follow and assist as needed.     Get OLIVEIRA, LISW-S, Social Work  [phone]    Electronically signed by TEE Siddiqi, LSW on 9/8/2021 at 10:52 AM

## 2021-09-08 NOTE — PROGRESS NOTES
Hospitalist Progress Note      PCP: Lowell Reid MD    Date of Admission: 9/7/2021        Hospital Course: Diagnosed for some time with Covid, stated that he was in bed for a week and when he started to move around he started to have shortness of breath, in the hospital he was on 2 L initially of oxygen currently on 1 L feeling better. Subjective: Feeling better but having headache, no shortness of breath chest pain or nausea at rest      Medications:  Reviewed    Infusion Medications    sodium chloride       Scheduled Medications    metoprolol succinate  50 mg Oral Daily    pantoprazole  40 mg Oral Daily    sodium chloride flush  5-40 mL IntraVENous 2 times per day    enoxaparin  30 mg SubCUTAneous BID    dexamethasone  6 mg Oral Daily     PRN Meds: LORazepam, sodium chloride flush, sodium chloride, acetaminophen **OR** acetaminophen, ondansetron, guaiFENesin-dextromethorphan, benzonatate    No intake or output data in the 24 hours ending 09/08/21 0936    Physical Exam Performed:    /78   Pulse 74   Temp 97.6 °F (36.4 °C) (Oral)   Resp 18   Ht 6' (1.829 m)   Wt (!) 333 lb 8.9 oz (151.3 kg)   SpO2 93%   BMI 45.24 kg/m²     General appearance: No apparent distress  Neck: Supple  Respiratory:  Normal respiratory effort. Clear to auscultation, bilaterally without Rales/Wheezes/Rhonchi. Cardiovascular: Regular rate and rhythm with normal S1/S2 without murmurs, rubs or gallops. Abdomen: Soft, non-tender, obese   Musculoskeletal: No clubbing, cyanosis   Skin: Skin color, texture, turgor normal.  No rashes or lesions.   Neurologic:  No focal weakness   Psychiatric: Alert and oriented  Capillary Refill: Brisk,3 seconds, normal   Peripheral Pulses: +2 palpable, equal bilaterally       Labs:   Recent Labs     09/07/21  1316 09/08/21  0644   WBC 7.7 6.3   HGB 14.7 15.2   HCT 42.9 43.3    254     Recent Labs     09/07/21  1316 09/08/21  0644   * 136   K 3.6 4.1   CL 97* 98*   CO2 24 26   BUN 6* 8   CREATININE 0.8* 0.7*   CALCIUM 8.9 9.0     No results for input(s): AST, ALT, BILIDIR, BILITOT, ALKPHOS in the last 72 hours. No results for input(s): INR in the last 72 hours. Recent Labs     09/07/21  1316   TROPONINI <0.01       Urinalysis:    No results found for: Delorse Lemming, BACTERIA, RBCUA, BLOODU, SPECGRAV, GLUCOSEU    Radiology:  CT CHEST PULMONARY EMBOLISM W CONTRAST   Final Result   1. Patchy nodular bilateral airspace disease due to an   infectious/inflammatory process including atypical viral pneumonia. XR CHEST (2 VW)   Final Result   Bilateral interstitial groundglass opacities are nonspecific and may   represent viral pneumonia, COVID-19 is not excluded. Assessment/Plan:    Active Hospital Problems    Diagnosis     Pneumonia due to COVID-19 virus [U07.1, J12.82]     HTN (hypertension) [I10]      1. Pneumonia due to COVID-19, started on dexamethasone, oxygen, will keep for now, will check CRP and D-dimer. Was on 2 L of oxygen currently on 1 L appears improving I will hold on remdesivir at this time  2. Acute respiratory failure with hypoxia, on 1 L currently we will try to wean off  3. Hypertension, will resume metoprolol  4. GERD, will resume pantoprazole  5. Anxiety will resume as needed Ativan  6. Obesity, counseled for weight loss        Diet: ADULT DIET; Regular;  Low Sodium (2 gm)  Code Status: Nnamdi Stanley MD

## 2021-09-09 VITALS
OXYGEN SATURATION: 97 % | DIASTOLIC BLOOD PRESSURE: 86 MMHG | RESPIRATION RATE: 18 BRPM | BODY MASS INDEX: 42.66 KG/M2 | HEART RATE: 73 BPM | SYSTOLIC BLOOD PRESSURE: 145 MMHG | WEIGHT: 315 LBS | TEMPERATURE: 98.2 F | HEIGHT: 72 IN

## 2021-09-09 LAB
ANION GAP SERPL CALCULATED.3IONS-SCNC: 10 MMOL/L (ref 3–16)
BASOPHILS ABSOLUTE: 0 K/UL (ref 0–0.2)
BASOPHILS RELATIVE PERCENT: 0.4 %
BUN BLDV-MCNC: 9 MG/DL (ref 7–20)
CALCIUM SERPL-MCNC: 9.4 MG/DL (ref 8.3–10.6)
CHLORIDE BLD-SCNC: 101 MMOL/L (ref 99–110)
CO2: 27 MMOL/L (ref 21–32)
CREAT SERPL-MCNC: 0.8 MG/DL (ref 0.9–1.3)
EOSINOPHILS ABSOLUTE: 0.1 K/UL (ref 0–0.6)
EOSINOPHILS RELATIVE PERCENT: 0.8 %
GFR AFRICAN AMERICAN: >60
GFR NON-AFRICAN AMERICAN: >60
GLUCOSE BLD-MCNC: 114 MG/DL (ref 70–99)
HCT VFR BLD CALC: 43.7 % (ref 40.5–52.5)
HEMOGLOBIN: 15.1 G/DL (ref 13.5–17.5)
LYMPHOCYTES ABSOLUTE: 1.7 K/UL (ref 1–5.1)
LYMPHOCYTES RELATIVE PERCENT: 14.4 %
MAGNESIUM: 2.1 MG/DL (ref 1.8–2.4)
MCH RBC QN AUTO: 31.2 PG (ref 26–34)
MCHC RBC AUTO-ENTMCNC: 34.6 G/DL (ref 31–36)
MCV RBC AUTO: 90.4 FL (ref 80–100)
MONOCYTES ABSOLUTE: 1 K/UL (ref 0–1.3)
MONOCYTES RELATIVE PERCENT: 8.6 %
NEUTROPHILS ABSOLUTE: 8.8 K/UL (ref 1.7–7.7)
NEUTROPHILS RELATIVE PERCENT: 75.8 %
PDW BLD-RTO: 12.3 % (ref 12.4–15.4)
PLATELET # BLD: 304 K/UL (ref 135–450)
PMV BLD AUTO: 8.4 FL (ref 5–10.5)
POTASSIUM SERPL-SCNC: 4.2 MMOL/L (ref 3.5–5.1)
RBC # BLD: 4.84 M/UL (ref 4.2–5.9)
SODIUM BLD-SCNC: 138 MMOL/L (ref 136–145)
WBC # BLD: 11.6 K/UL (ref 4–11)

## 2021-09-09 PROCEDURE — 6360000002 HC RX W HCPCS: Performed by: STUDENT IN AN ORGANIZED HEALTH CARE EDUCATION/TRAINING PROGRAM

## 2021-09-09 PROCEDURE — 2580000003 HC RX 258: Performed by: STUDENT IN AN ORGANIZED HEALTH CARE EDUCATION/TRAINING PROGRAM

## 2021-09-09 PROCEDURE — 6370000000 HC RX 637 (ALT 250 FOR IP): Performed by: STUDENT IN AN ORGANIZED HEALTH CARE EDUCATION/TRAINING PROGRAM

## 2021-09-09 PROCEDURE — 94760 N-INVAS EAR/PLS OXIMETRY 1: CPT

## 2021-09-09 PROCEDURE — 85025 COMPLETE CBC W/AUTO DIFF WBC: CPT

## 2021-09-09 PROCEDURE — 80048 BASIC METABOLIC PNL TOTAL CA: CPT

## 2021-09-09 PROCEDURE — 83735 ASSAY OF MAGNESIUM: CPT

## 2021-09-09 PROCEDURE — 36415 COLL VENOUS BLD VENIPUNCTURE: CPT

## 2021-09-09 PROCEDURE — 6370000000 HC RX 637 (ALT 250 FOR IP): Performed by: INTERNAL MEDICINE

## 2021-09-09 RX ORDER — DEXAMETHASONE 6 MG/1
6 TABLET ORAL DAILY
Qty: 8 TABLET | Refills: 0 | Status: SHIPPED | OUTPATIENT
Start: 2021-09-10 | End: 2021-09-18

## 2021-09-09 RX ORDER — GUAIFENESIN/DEXTROMETHORPHAN 100-10MG/5
5 SYRUP ORAL EVERY 4 HOURS PRN
Qty: 120 ML | Refills: 0 | Status: SHIPPED | OUTPATIENT
Start: 2021-09-09 | End: 2021-09-19

## 2021-09-09 RX ORDER — BENZONATATE 100 MG/1
100 CAPSULE ORAL 3 TIMES DAILY PRN
Qty: 30 CAPSULE | Refills: 0 | Status: SHIPPED | OUTPATIENT
Start: 2021-09-09 | End: 2021-09-16

## 2021-09-09 RX ADMIN — BENZONATATE 100 MG: 100 CAPSULE ORAL at 05:22

## 2021-09-09 RX ADMIN — ENOXAPARIN SODIUM 30 MG: 30 INJECTION SUBCUTANEOUS at 08:01

## 2021-09-09 RX ADMIN — GUAIFENESIN SYRUP AND DEXTROMETHORPHAN 5 ML: 100; 10 SYRUP ORAL at 05:22

## 2021-09-09 RX ADMIN — DEXAMETHASONE 6 MG: 6 TABLET ORAL at 08:01

## 2021-09-09 RX ADMIN — Medication 10 ML: at 08:03

## 2021-09-09 RX ADMIN — PANTOPRAZOLE SODIUM 40 MG: 40 TABLET, DELAYED RELEASE ORAL at 08:01

## 2021-09-09 NOTE — PROGRESS NOTES
RD did not conduct direct, in-person nutrition evaluation in efforts to reduce exposure and use of PPE for high risk persons, PUI persons, patients who have tested positive for Covid-19 or those awaiting respiratory panel results. EMR was screened for nutrition risk factors, as defined per nutrition standards of care. Nutrition Assessment     Type and Reason for Visit: Initial, Positive Nutrition Screen    Nutrition Recommendations/Plan:   Continue on regular, lo sodium diet  Monitor intake and need for ONS if intake declines        Nutrition Assessment:  Pt admitted with pneuonia due to Covid 19. PMH includes: obesity, HTN, GERD. Pt had positive nutrition screen of 3 due to wt loss and poor po. No wt record in EMR. Wt gain indicated since admission. Pt currenly eating % of meals. Will continue on regular, low sodium diet and continue to monitor. Malnutrition Assessment:  Malnutrition Status: Insufficient data  RD did not conduct direct, in-person nutrition evaluation in efforts to reduce exposure and use of PPE for high risk persons, PUI persons, patients who have tested positive for Covid-19 or those awaiting respiratory panel results. EMR was screened for nutrition risk factors, as defined per nutrition standards of care. Nutrition Related Findings: no BM recorded, BLE trace edema      Current Nutrition Therapies:    ADULT DIET; Regular;  Low Sodium (2 gm)    Anthropometric Measures:  · Height: 6' (182.9 cm)  · Current Body Wt: 338 lb 6.5 oz (153.5 kg)   · BMI: 45.9    Nutrition Diagnosis:   · Inadequate oral intake related to   as evidenced by poor intake prior to admission      Nutrition Interventions:   Food and/or Nutrient Delivery:  Continue Current Diet  Nutrition Education/Counseling:  No recommendation at this time   Coordination of Nutrition Care:  Continue to monitor while inpatient    Goals:  Continued intake >50%       Nutrition Monitoring and Evaluation: Behavioral-Environmental Outcomes:  None Identified   Food/Nutrient Intake Outcomes:  Food and Nutrient Intake  Physical Signs/Symptoms Outcomes:  Biochemical Data, Fluid Status or Edema, Nutrition Focused Physical Findings, Weight     Discharge Planning:    No discharge needs at this time     Electronically signed by Cristin Carroll RD, LD on 9/9/21 at 11:50 AM EDT    Contact: [phone]

## 2021-09-09 NOTE — DISCHARGE INSTR - DIET

## 2021-09-09 NOTE — PLAN OF CARE
Problem: Airway Clearance - Ineffective  Goal: Achieve or maintain patent airway  8/7/1659 1347 by Elroy Mortimer, RN  Outcome: Ongoing  9/8/2021 2356 by Rob Roman RN  Outcome: Ongoing     Problem: Gas Exchange - Impaired  Goal: Absence of hypoxia  1/7/1038 1213 by Elroy Mortimer, RN  Outcome: Ongoing  9/8/2021 2356 by Rob Roman RN  Outcome: Ongoing  Goal: Promote optimal lung function  4/0/3307 5335 by Elroy Mortimer, RN  Outcome: Ongoing  9/8/2021 2356 by Rob Roman RN  Outcome: Ongoing     Problem: Breathing Pattern - Ineffective  Goal: Ability to achieve and maintain a regular respiratory rate  6/1/1922 8109 by Elroy Mortimer, RN  Outcome: Ongoing  9/8/2021 2356 by Rob Roman RN  Outcome: Ongoing     Problem:  Body Temperature -  Risk of, Imbalanced  Goal: Ability to maintain a body temperature within defined limits  9/5/9883 7029 by Elroy Mortimer, RN  Outcome: Ongoing  9/8/2021 2356 by Rob Roman RN  Outcome: Ongoing  Goal: Will regain or maintain usual level of consciousness  8/9/2652 0375 by Elroy Mortimer, RN  Outcome: Ongoing  9/8/2021 2356 by Rob Roman RN  Outcome: Ongoing  Goal: Complications related to the disease process, condition or treatment will be avoided or minimized  2/8/7377 2242 by Elroy Mortimer, RN  Outcome: Ongoing  9/8/2021 2356 by Rob Roman RN  Outcome: Ongoing     Problem: Isolation Precautions - Risk of Spread of Infection  Goal: Prevent transmission of infection  1/2/2972 6749 by Elroy Mortimer, RN  Outcome: Ongoing  9/8/2021 2356 by Rob Roman RN  Outcome: Ongoing     Problem: Nutrition Deficits  Goal: Optimize nutritional status  8/4/9923 8770 by Elroy Mortimer, RN  Outcome: Ongoing  9/8/2021 2356 by Rob Roman RN  Outcome: Ongoing     Problem: Risk for Fluid Volume Deficit  Goal: Maintain normal heart rhythm  2/3/0332 7692 by Elroy Mortimer, RN  Outcome: Ongoing  9/8/2021 2356 by Christia Slimmer, RN  Outcome: Ongoing  Goal: Maintain absence of muscle cramping  6/8/0232 5264 by Nori Gomez RN  Outcome: Ongoing  9/8/2021 2356 by Layton Gerard RN  Outcome: Ongoing  Goal: Maintain normal serum potassium, sodium, calcium, phosphorus, and pH  2/5/8492 5672 by Nori Gomez RN  Outcome: Ongoing  9/8/2021 2356 by Layton Gerard RN  Outcome: Ongoing     Problem: Loneliness or Risk for Loneliness  Goal: Demonstrate positive use of time alone when socialization is not possible  3/6/3573 2276 by Nori Gomez RN  Outcome: Ongoing  9/8/2021 2356 by Layton Gerard RN  Outcome: Ongoing     Problem: Fatigue  Goal: Verbalize increase energy and improved vitality  1/1/1275 0182 by Nori Gomez RN  Outcome: Ongoing  9/8/2021 2356 by Layton Gerard RN  Outcome: Ongoing     Problem: Patient Education: Go to Patient Education Activity  Goal: Patient/Family Education  9/1/4091 2979 by Nori Gomez RN  Outcome: Ongoing  9/8/2021 2356 by Layton Gerard RN  Outcome: Ongoing     Problem: Pain:  Goal: Pain level will decrease  Description: Pain level will decrease  7/6/4514 7141 by Nori Gomez RN  Outcome: Ongoing  9/8/2021 2356 by Layton Gerard RN  Outcome: Ongoing  Goal: Control of acute pain  Description: Control of acute pain  1/3/7183 5175 by Nori Gomez RN  Outcome: Ongoing  9/8/2021 2356 by Layton Gerard RN  Outcome: Ongoing  Goal: Control of chronic pain  Description: Control of chronic pain  6/3/7976 9765 by Nori Gomez RN  Outcome: Ongoing  9/8/2021 2356 by Layton Gerard RN  Outcome: Ongoing

## 2021-09-09 NOTE — DISCHARGE SUMMARY
Hospital Medicine Discharge Summary    Patient ID: Jabier Matos      Patient's PCP: Michael Contreras MD    Admit Date: 9/7/2021     Discharge Date:   09/09/2021    Admitting Physician: Lena Verdugo DO     Discharge Physician: Jose Stein MD     Discharge Diagnoses: Active Hospital Problems    Diagnosis     Pneumonia due to COVID-19 virus [U07.1, J12.82]     HTN (hypertension) [I10]        The patient was seen and examined on day of discharge and this discharge summary is in conjunction with any daily progress note from day of discharge. Hospital Course:     From HPI:\"The patient is a 53 y.o. male past medical history of GERD and hypertension who presents to [facility] with initially 3 weeks of persisting symptoms of increasing fatigue and poor appetite as well as some mild initial shortness of breath especially on exertion but has progressively been worsening over the last week or so. Patient apparently tested positive for Covid with initial symptoms starting 3 weeks ago. His son is also diagnosed positive with Covid and potentially his wife is also affected but she currently seems to be more benign at this time. Patient notes he tried multiple over-the-counter medications for his symptoms but he has progressively been declining and has had increasing level of cough and congestion with intermittent fevers and chills at home and increasing fatigue to the point of his inability to function properly at home. He notes that the shortness of breath has been worsening mainly on exertion but does not feel entirely short of breath at rest.  Otherwise denies any other recent symptoms of abdominal pain/nausea/vomiting/diarrhea, dysuria, blood in urine/stool/sputum, leg swelling, chest pain other than what is noted from his coughing\"      1. Pneumonia due to COVID-19, started on dexamethasone, oxygen, will keep for dexamethasone for 8 more days,CRP noted.   Was on 2 L of oxygen mention, on room air since yesterday feeling better still having some nasal congestion expected, advised if any worsening of his symptoms or any new symptoms to seek immediate medical help, he verbalized understanding and agreement  2. Acute respiratory failure with hypoxia, room air since yesterday  3. Hypertension, will resume metoprolol  4. GERD, will resume pantoprazole  5. Anxiety will resume as needed Ativan  6. Obesity, counseled for weight loss          Physical Exam Performed:     BP (!) 143/81   Pulse 85   Temp 98 °F (36.7 °C) (Oral)   Resp 18   Ht 6' (1.829 m)   Wt (!) 338 lb 6.5 oz (153.5 kg)   SpO2 96%   BMI 45.90 kg/m²       General appearance:  No apparent distress  HEENT:  Normal cephalic  Neck: Supple  Respiratory:  Normal respiratory effort. Clear to auscultation, bilaterally without Rales/Wheezes/Rhonchi. Cardiovascular:  Regular rate and rhythm with normal S1/S2 without murmurs, rubs or gallops. Abdomen: Soft, non-tender, non-distended  Musculoskeletal:  No clubbing, cyanosis  Skin: Skin color, texture, turgor normal.  No rashes or lesions. Neurologic:  NO FOCAL WEAKNESS   Psychiatric:  Alert and oriented  Capillary Refill: Brisk,< 3 seconds   Peripheral Pulses: +2 palpable, equal bilaterally       Labs: For convenience and continuity at follow-up the following most recent labs are provided:      CBC:    Lab Results   Component Value Date    WBC 11.6 09/09/2021    HGB 15.1 09/09/2021    HCT 43.7 09/09/2021     09/09/2021       Renal:    Lab Results   Component Value Date     09/09/2021    K 4.2 09/09/2021    K 3.6 09/07/2021     09/09/2021    CO2 27 09/09/2021    BUN 9 09/09/2021    CREATININE 0.8 09/09/2021    CALCIUM 9.4 09/09/2021         Significant Diagnostic Studies    Radiology:   CT CHEST PULMONARY EMBOLISM W CONTRAST   Final Result   1.  Patchy nodular bilateral airspace disease due to an   infectious/inflammatory process including atypical viral pneumonia. XR CHEST (2 VW)   Final Result   Bilateral interstitial groundglass opacities are nonspecific and may   represent viral pneumonia, COVID-19 is not excluded. Consults:     IP CONSULT TO HOSPITALIST    Disposition:  home     Condition at Discharge: Stable    Discharge Instructions/Follow-up:  PCP    Code Status:  Limited     Activity: activity as tolerated    Diet: cardiac diet      Discharge Medications:     Current Discharge Medication List           Details   dexamethasone (DECADRON) 6 MG tablet Take 1 tablet by mouth daily for 8 doses  Qty: 8 tablet, Refills: 0      benzonatate (TESSALON) 100 MG capsule Take 1 capsule by mouth 3 times daily as needed for Cough  Qty: 30 capsule, Refills: 0      guaiFENesin-dextromethorphan (ROBITUSSIN DM) 100-10 MG/5ML syrup Take 5 mLs by mouth every 4 hours as needed for Cough  Qty: 120 mL, Refills: 0              Details   pantoprazole (PROTONIX) 40 MG tablet Take 40 mg by mouth daily      metoprolol succinate (TOPROL XL) 50 MG extended release tablet Take 50 mg by mouth daily      furosemide (LASIX) 40 MG tablet Take 40 mg by mouth daily as needed (swelling)       LORazepam (ATIVAN) 1 MG tablet Take 1 mg by mouth daily as needed for Anxiety. Time Spent on discharge is more than 45 minutes in the examination, evaluation, counseling and review of medications and discharge plan. Signed:    Joanna Stephen MD   9/9/2021      Thank you Hamilton Cisneros MD for the opportunity to be involved in this patient's care. If you have any questions or concerns please feel free to contact me at [phone].

## 2021-09-09 NOTE — CARE COORDINATION
SW gave work note to PCA who was about to enter the room. No further needs. Respectfully submitted,    Sara OLIVEIRA, RIA-S  [facility]   [phone]    Electronically signed by LEXIE Caro on 9/9/2021 at 2:43 PM

## 2021-09-09 NOTE — CARE COORDINATION
INITIAL CASE MANAGEMENT ASSESSMENT (follow up)     DME: Patient was weaned off of oxygen last night at approximately 7pm.      PT/OT Recs: Not ordered.      PLAN/COMMENTS:     SW spoke to patient via telephone today and he stated that he hasn't needed oxygen but he just started having coughing fits. SW will continue to monitor as it appears as if he is close to being ready for discharge. 1) Patient will need a work note     Respectfully submitted,     LEXIE Mancia  [facility]   [phone]    Electronically signed by Arcelia Baumgarten, LISW-S on 9/9/2021 at 9:50 AM

## 2021-09-09 NOTE — CARE COORDINATION
SW spoke to patient via telephone today regarding discharge needs. He stated that his wife was picking him up and he needs a work note. SW will deliver it momentarily. No further needs noted. Respectfully submitted,    LEXIE Ndiaye  [facility]   [phone]    Electronically signed by LEXIE Luna on 9/9/2021 at 1:49 PM

## 2021-09-09 NOTE — PLAN OF CARE
Problem: Airway Clearance - Ineffective  Goal: Achieve or maintain patent airway  9/8/2021 2356 by Elvira Alvarez RN  Outcome: Ongoing  9/8/2021 1430 by Marin Gamboa RN  Outcome: Ongoing     Problem: Gas Exchange - Impaired  Goal: Absence of hypoxia  9/8/2021 2356 by Elvira Alvarez RN  Outcome: Ongoing  9/8/2021 1430 by Marin Gamboa RN  Outcome: Ongoing  Note: Pt will maintain absence of respiratory complications. Oxygen saturations >90% at all times with supplemental O2 as needed. Will assess respiratory status every shift and PRN. Encourage to cough and deep breath. Encourage HHN as ordered. Monitor I/O. Maintain IVF's as ordered. Goal: Promote optimal lung function  Outcome: Ongoing     Problem: Breathing Pattern - Ineffective  Goal: Ability to achieve and maintain a regular respiratory rate  9/8/2021 2356 by Elvira Alvarez RN  Outcome: Ongoing  9/8/2021 1430 by Marin Gamboa RN  Outcome: Ongoing     Problem:  Body Temperature -  Risk of, Imbalanced  Goal: Ability to maintain a body temperature within defined limits  9/8/2021 2356 by Elvira Alvarez RN  Outcome: Ongoing  9/8/2021 1430 by Marin Gamboa RN  Outcome: Ongoing  Goal: Will regain or maintain usual level of consciousness  Outcome: Ongoing  Goal: Complications related to the disease process, condition or treatment will be avoided or minimized  Outcome: Ongoing     Problem: Isolation Precautions - Risk of Spread of Infection  Goal: Prevent transmission of infection  9/8/2021 2356 by Elvira Alvarez RN  Outcome: Ongoing  9/8/2021 1430 by Marin Gamboa RN  Outcome: Ongoing     Problem: Nutrition Deficits  Goal: Optimize nutritional status  9/8/2021 2356 by Elvira Alvarez RN  Outcome: Ongoing  9/8/2021 1430 by Marin Gamboa RN  Outcome: Ongoing     Problem: Risk for Fluid Volume Deficit  Goal: Maintain normal heart rhythm  9/8/2021 2356 by Elvira Alvarez RN  Outcome: Ongoing  9/8/2021 1430 by Marin Gamboa RN  Outcome: Ongoing  Goal: Maintain absence of muscle cramping  Outcome: Ongoing  Goal: Maintain normal serum potassium, sodium, calcium, phosphorus, and pH  Outcome: Ongoing     Problem: Loneliness or Risk for Loneliness  Goal: Demonstrate positive use of time alone when socialization is not possible  9/8/2021 2356 by Trista Mills RN  Outcome: Ongoing  9/8/2021 1430 by Grace De Oliveira RN  Outcome: Ongoing     Problem: Fatigue  Goal: Verbalize increase energy and improved vitality  Outcome: Ongoing     Problem: Pain:  Description: Pain management should include both nonpharmacologic and pharmacologic interventions. Goal: Pain level will decrease  Description: Pain level will decrease  9/8/2021 2356 by Trista Mills RN  Outcome: Ongoing  9/8/2021 1430 by Grace De Oliveira RN  Outcome: Ongoing  Note: Pain/discomfort being managed with PRN analgesics per MD order. Pt able to express presence and absence of pain and rate pain appropriately using numerical scale    Goal: Control of acute pain  Description: Control of acute pain  Outcome: Ongoing  Goal: Control of chronic pain  Description: Control of chronic pain  Outcome: Ongoing     Problem: Pain:  Description: Pain management should include both nonpharmacologic and pharmacologic interventions. Goal: Pain level will decrease  Description: Pain level will decrease  9/8/2021 2356 by Trista Mills RN  Outcome: Ongoing  9/8/2021 1430 by Grace De Oliveira RN  Outcome: Ongoing  Note: Pain/discomfort being managed with PRN analgesics per MD order. Pt able to express presence and absence of pain and rate pain appropriately using numerical scale    Goal: Control of acute pain  Description: Control of acute pain  Outcome: Ongoing  Goal: Control of chronic pain  Description: Control of chronic pain  Outcome: Ongoing   Pt able to express presence/absence of pain and rate pain appropriately using numerical scale. Pain/discomfort being managed with PRN analgesics per MD orders (see MAR).  Pain assessed every shift

## 2021-09-10 ENCOUNTER — CARE COORDINATION (OUTPATIENT)
Dept: CASE MANAGEMENT | Age: 51
End: 2021-09-10

## 2021-09-10 DIAGNOSIS — U07.1 PNEUMONIA DUE TO COVID-19 VIRUS: Primary | ICD-10-CM

## 2021-09-10 DIAGNOSIS — J12.82 PNEUMONIA DUE TO COVID-19 VIRUS: Primary | ICD-10-CM

## 2021-09-10 NOTE — CARE COORDINATION
Transitions of Care Initial Call    Was this an external facility discharge? No Discharge Facility: N/A    Challenges to be reviewed by the provider   Additional needs identified to be addressed with provider: No               Method of communication with provider : none        Was this a readmission? No  Patient stated reason for admission: fatigue/poor appetite/SOB      Care Transition Nurse (CTN) contacted the patient by telephone to perform post hospital discharge assessment. Verified name and  with patient as identifiers. Provided introduction to self, and explanation of the CTN role. CTN reviewed discharge instructions, medical action plan and red flags with patient who verbalized understanding. Patient given an opportunity to ask questions and does not have any further questions or concerns at this time. Were discharge instructions available to patient? Yes. Reviewed appropriate site of care based on symptoms and resources available to patient including: PCP. The family agrees to contact the PCP office for questions related to their healthcare. Medication reconciliation was performed with patient, who verbalizes understanding of administration of home medications. Advised obtaining a 90-day supply of all daily and as-needed medications. Covid Risk Education     Educated patient about risk for severe COVID-19 due to risk factors according to CDC guidelines. CTN reviewed discharge instructions, medical action plan and red flag symptoms with the patient who verbalized understanding. Discussed COVID vaccination status: Yes. Iva Rendon states he did not receive the COVID vaccine. Education provided on COVID-19 vaccination as appropriate. Discussed exposure protocols and quarantine with CDC Guidelines. Patient was given an opportunity to verbalize any questions and concerns and agrees to contact CTN or health care provider for questions related to their healthcare.         Was patient discharged with a pulse oximeter? No Discussed and confirmed pulse oximeter discharge instructions and when to notify provider or seek emergency care. CTN provided contact information. Initial attempt at [place] [address] monitoring discharge phone call. Roby Servin states he is \"doing good\". He states he is attempting to schedule a follow up appointment with his PCP, but they are having phone difficulties. Roby Servin states his level of fatigue is \"way better\". He states his appetite returned while he was in the hospital. Roby Servin denies any SOB at this time. He states his most recent O2 sat = 95% on room air. He denies any needs at this time. Encouraged Roby Servin to reach out to his PCP with any non life threatening medical issues or concerns over the weekend. Encouraged him to return to the ED with any increase of SOB/O2 sat dipping down and not recovering/any issue he feels may be related to a worsening status of COVID. Roby Servin verbalized understanding.

## 2021-09-23 ENCOUNTER — CARE COORDINATION (OUTPATIENT)
Dept: CASE MANAGEMENT | Age: 51
End: 2021-09-23

## 2021-09-23 NOTE — CARE COORDINATION
Unable to reach patient for COVID 19 monitoring follow up phone call. Left message. Contact info provided. Requested return call to CTN.

## 2021-09-30 ENCOUNTER — CARE COORDINATION (OUTPATIENT)
Dept: CASE MANAGEMENT | Age: 51
End: 2021-09-30

## 2021-10-07 ENCOUNTER — CARE COORDINATION (OUTPATIENT)
Dept: CASE MANAGEMENT | Age: 51
End: 2021-10-07

## 2021-10-07 NOTE — CARE COORDINATION
Final attempt at COVID 19 monitoring follow up phone call. Unable to reach patient. Left message. Informed Delia Brasher this would be the final CTN outreach.